# Patient Record
(demographics unavailable — no encounter records)

---

## 2025-03-14 NOTE — PROCEDURE
[FreeTextEntry3] : Aspiration  was performed of the left knee. Aspiration was indicated due to effusion. The amount aspirated was 85 cc. The amount aspirated was clear nsf. Patient tolerated procedure well.   Large joint injection was performed of the left knee. An injection of Lidocaine 3cc of 1% , Bupivacaine (Marcaine) 6cc of 0.5% , Triamcinolone (Kenalog) 2cc of 40 mg  was used.  Patient was advised to call if redness, pain or fever occur and apply ice for 15 minutes out of every hour for the next 12-24 hours as tolerated.   Patient has tried OTC's including aspirin, Ibuprofen, Aleve, etc or prescription NSAIDS, and/or exercises at home and/or physical therapy without satisfactory response, patient had decreased mobility in the joint and the risks benefits, and alternatives have been discussed, and verbal consent was obtained.  The site was prepped with alcohol, betadine and ethyl chloride sprayed topically  The risks, benefits and contents of the injection have been discussed.  Risks include but are not limited to allergic reaction, flare reaction, permanent white skin discoloration at the injection site and infection.  The patient understands the risks and agrees to having the injection.  All questions have been answered.  Ultrasound guidance was indicated for this patient due to prior failure or difficult injection. All ultrasound images have been permanently captured and stored accordingly in our picture archiving and communication system.

## 2025-03-14 NOTE — PHYSICAL EXAM
[Left] : left knee [NL (140)] : flexion 140 degrees [NL (0)] : extension 0 degrees [5___] : hamstring 5[unfilled]/5 [There are no fractures, subluxations or dislocations. No significant abnormalities are seen] : There are no fractures, subluxations or dislocations. No significant abnormalities are seen [Degenerative change] : Degenerative change [Right] : right hip with pelvis [] : no tenderness [FreeTextEntry9] : OA. large ossification noted

## 2025-03-14 NOTE — HISTORY OF PRESENT ILLNESS
[Full time] : Work status: full time [de-identified] : 03/14/2025 Mr. ISATU HAHN, a 52 year old male, presents today for left knee and right groin pain. The groin is a chronic issue from 20 years ago while playing soccer and the knee began 6 months ago without inciting injury. He has used topical treatments. Denies prior injections. He works as a .  PMH: HTN.  [FreeTextEntry5] : 3/14/25: Patient is here today for left knee and right groin pain, pain started for the groin, from years ago, as for the knee pain, few months ago. he started he did have a injury 20 years ago within the groin. no radiating pain for both areas. [de-identified] :

## 2025-03-14 NOTE — HISTORY OF PRESENT ILLNESS
[Full time] : Work status: full time [de-identified] : 03/14/2025 Mr. ISATU HAHN, a 52 year old male, presents today for left knee and right groin pain. The groin is a chronic issue from 20 years ago while playing soccer and the knee began 6 months ago without inciting injury. He has used topical treatments. Denies prior injections. He works as a .  PMH: HTN.  [FreeTextEntry5] : 3/14/25: Patient is here today for left knee and right groin pain, pain started for the groin, from years ago, as for the knee pain, few months ago. he started he did have a injury 20 years ago within the groin. no radiating pain for both areas. [de-identified] :

## 2025-03-14 NOTE — DISCUSSION/SUMMARY
[de-identified] : 52 M with left knee DJD and right hip pelvic mass seen on x-ray The patient was advised of the diagnosis. The natural history of the pathology was explained in full to the patient in layman's terms. All questions were answered. Arthritis is a progressive problem that once occurs will be a chronic issue that will likely continue until surgical treatment is necessary. Treatment options for arthritis include OTC NSAIDS, prescription NSAIDS, ice, bracing, physical therapy, cortisone and/or visco injections, and surgery.    1) csi / asp left knee today - tolerated well  - 85cc 2) MRI pelvis to eval mass seen on xray  3) Follow up after MRI  Entered by Kelsey Navarro acting as scribe. Dr. Jones- The documentation recorded by the scribe accurately reflects the service I personally performed and the decisions made by me.

## 2025-03-14 NOTE — DISCUSSION/SUMMARY
[de-identified] : 52 M with left knee DJD and right hip pelvic mass seen on x-ray The patient was advised of the diagnosis. The natural history of the pathology was explained in full to the patient in layman's terms. All questions were answered. Arthritis is a progressive problem that once occurs will be a chronic issue that will likely continue until surgical treatment is necessary. Treatment options for arthritis include OTC NSAIDS, prescription NSAIDS, ice, bracing, physical therapy, cortisone and/or visco injections, and surgery.    1) csi / asp left knee today - tolerated well  - 85cc 2) MRI pelvis to eval mass seen on xray  3) Follow up after MRI  Entered by Kelsey Navarro acting as scribe. Dr. Jones- The documentation recorded by the scribe accurately reflects the service I personally performed and the decisions made by me.

## 2025-03-28 NOTE — DISCUSSION/SUMMARY
[de-identified] : 52m with MRI of the pelvis showing chronic bone remodeling at the right adductor tendon insertion suggesting chronic avulsion injury. b/l inguinal hernias. Possible large hydrocele/spermatocele in the right testes. Mild chronic partial tearing proximal left hamstring with moderate assoc. enthesopathic changes.   The MRI results and images were reviewed with the patient.   Recommended consult with Dr. Duong re: b/l inguinal hernias- pt provided with contact information  Recommended f/up with urologist re MRI findings for ultrasound evaluation rtc prn in regards to left knee djd.   Entered by Kelsey Navarro acting as scribe. Dr. Jones- The documentation recorded by the scribe accurately reflects the service I personally performed and the decisions made by me.

## 2025-03-28 NOTE — DISCUSSION/SUMMARY
[de-identified] : 52m with MRI of the pelvis showing chronic bone remodeling at the right adductor tendon insertion suggesting chronic avulsion injury. b/l inguinal hernias. Possible large hydrocele/spermatocele in the right testes. Mild chronic partial tearing proximal left hamstring with moderate assoc. enthesopathic changes.   The MRI results and images were reviewed with the patient.   Recommended consult with Dr. Duong re: b/l inguinal hernias- pt provided with contact information  Recommended f/up with urologist re MRI findings for ultrasound evaluation rtc prn in regards to left knee djd.   Entered by Kelsey Navarro acting as scribe. Dr. Jones- The documentation recorded by the scribe accurately reflects the service I personally performed and the decisions made by me.

## 2025-03-28 NOTE — HISTORY OF PRESENT ILLNESS
[] : yes [Full time] : Work status: full time [de-identified] : 3/27/25: Here to f/u L knee and review pelvis MRI today. CSI/aspiration (85cc) L knee last visit with relief.  03/14/2025 Mr. ISATU HAHN, a 52 year old male, presents today for left knee and right groin pain. The groin is a chronic issue from 20 years ago while playing soccer and the knee began 6 months ago without inciting injury. He has used topical treatments. Denies prior injections. He works as a .  PMH: HTN.  [FreeTextEntry1] : L knee, pelvis  [de-identified] :

## 2025-03-28 NOTE — PHYSICAL EXAM
[Right] : right hip with pelvis [Left] : left knee [NL (140)] : flexion 140 degrees [NL (0)] : extension 0 degrees [5___] : hamstring 5[unfilled]/5 [There are no fractures, subluxations or dislocations. No significant abnormalities are seen] : There are no fractures, subluxations or dislocations. No significant abnormalities are seen [Degenerative change] : Degenerative change [] : no tenderness [FreeTextEntry9] : OA. large ossification noted

## 2025-03-28 NOTE — DATA REVIEWED
[MRI] : MRI [Pelvis] : pelvis [Report was reviewed and noted in the chart] : The report was reviewed and noted in the chart [I independently reviewed and interpreted images and report] : I independently reviewed and interpreted images and report [I reviewed the films/CD] : I reviewed the films/CD [FreeTextEntry1] : 3/20/25 [facility] - independent interpretation on 03/27/2025: chronic bone remodeling at the right adductor tendon insertion suggesting chronic avulsion injury. b/l inguinal hernias. Possible large hydrocele/spermatocele in the right testes. Mild chronic partial tearing proximal left hamstring with moderate assoc. enthesopathic changes.

## 2025-03-28 NOTE — HISTORY OF PRESENT ILLNESS
[] : yes [Full time] : Work status: full time [de-identified] : 3/27/25: Here to f/u L knee and review pelvis MRI today. CSI/aspiration (85cc) L knee last visit with relief.  03/14/2025 Mr. ISATU HAHN, a 52 year old male, presents today for left knee and right groin pain. The groin is a chronic issue from 20 years ago while playing soccer and the knee began 6 months ago without inciting injury. He has used topical treatments. Denies prior injections. He works as a .  PMH: HTN.  [FreeTextEntry1] : L knee, pelvis  [de-identified] :